# Patient Record
Sex: FEMALE
[De-identification: names, ages, dates, MRNs, and addresses within clinical notes are randomized per-mention and may not be internally consistent; named-entity substitution may affect disease eponyms.]

---

## 2019-06-10 PROBLEM — Z00.00 ENCOUNTER FOR PREVENTIVE HEALTH EXAMINATION: Status: ACTIVE | Noted: 2019-06-10

## 2020-08-03 ENCOUNTER — APPOINTMENT (OUTPATIENT)
Dept: OPHTHALMOLOGY | Facility: CLINIC | Age: 35
End: 2020-08-03
Payer: COMMERCIAL

## 2020-08-03 ENCOUNTER — NON-APPOINTMENT (OUTPATIENT)
Age: 35
End: 2020-08-03

## 2020-08-03 PROCEDURE — 92004 COMPRE OPH EXAM NEW PT 1/>: CPT

## 2023-01-13 ENCOUNTER — APPOINTMENT (OUTPATIENT)
Dept: PEDIATRIC ORTHOPEDIC SURGERY | Facility: CLINIC | Age: 38
End: 2023-01-13
Payer: COMMERCIAL

## 2023-01-13 VITALS
SYSTOLIC BLOOD PRESSURE: 118 MMHG | DIASTOLIC BLOOD PRESSURE: 93 MMHG | WEIGHT: 145 LBS | TEMPERATURE: 97.3 F | BODY MASS INDEX: 26.68 KG/M2 | HEIGHT: 62 IN

## 2023-01-13 DIAGNOSIS — M22.42 CHONDROMALACIA PATELLAE, LEFT KNEE: ICD-10-CM

## 2023-01-13 PROCEDURE — 73562 X-RAY EXAM OF KNEE 3: CPT

## 2023-01-13 PROCEDURE — 99213 OFFICE O/P EST LOW 20 MIN: CPT

## 2023-01-13 RX ORDER — MELOXICAM 15 MG/1
15 TABLET ORAL
Qty: 30 | Refills: 1 | Status: ACTIVE | COMMUNITY
Start: 2023-01-13 | End: 1900-01-01

## 2023-01-17 NOTE — HISTORY OF PRESENT ILLNESS
[de-identified] : This 37-year-old pleasant lady is seen for evaluation of the left knee.  This patient over the past week to ten days has had insidious onset of pain more so to the anterior aspect of the knee with no obvious traumatic or precipitating event.  She does recall dancing around the holidays though no difficulty that she noticed.  She has noted swelling and stiffness no significant limp locking buckling or sensation of instability.  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  She has been doing well over the long-term.  She is allergic to amoxicillin

## 2023-01-17 NOTE — PHYSICAL EXAM
[de-identified] : Examination today reveals no significant limp with normal stance.  She does have full motion to the left hip and left knee, in fact, the left knee just goes into mild rectal bottom.  She has no evidence of instability on stress of the cruciate/collateral ligaments and no significant joint line tenderness.  She does have discomfort on patellofemoral grind with negative apprehension.  Though there is clearly laxity present and I can translate the patella easily both medially as well as laterally.  Popliteal fossa calf neuro vas exam are negative.\par  [de-identified] : Three view X-rays of the left knee to include a skyline was performed today in the office revealing obvious tilt to the patellofemoral joint on both sides.

## 2023-01-17 NOTE — ASSESSMENT
[FreeTextEntry1] : Impression: Chondromalacia patella left knee.\par \par She will be treated with Mobic with GI precautions.  We have also discussed activities to avoid primarily those that require quick change and torque to the knee.  If she is not improving the potential for physical therapy and steroid injection has been discussed.  She will return as necessary